# Patient Record
Sex: FEMALE | Race: OTHER
[De-identification: names, ages, dates, MRNs, and addresses within clinical notes are randomized per-mention and may not be internally consistent; named-entity substitution may affect disease eponyms.]

---

## 2018-09-20 ENCOUNTER — HOSPITAL ENCOUNTER (OUTPATIENT)
Dept: HOSPITAL 47 - ORWHC2ENDO | Age: 68
Discharge: HOME | End: 2018-09-20
Attending: SURGERY
Payer: MEDICARE

## 2018-09-20 VITALS — TEMPERATURE: 97.8 F

## 2018-09-20 VITALS — HEART RATE: 71 BPM | DIASTOLIC BLOOD PRESSURE: 77 MMHG | RESPIRATION RATE: 16 BRPM | SYSTOLIC BLOOD PRESSURE: 125 MMHG

## 2018-09-20 VITALS — BODY MASS INDEX: 30.2 KG/M2

## 2018-09-20 DIAGNOSIS — K21.9: ICD-10-CM

## 2018-09-20 DIAGNOSIS — J45.909: ICD-10-CM

## 2018-09-20 DIAGNOSIS — Z79.899: ICD-10-CM

## 2018-09-20 DIAGNOSIS — K29.50: Primary | ICD-10-CM

## 2018-09-20 DIAGNOSIS — R41.3: ICD-10-CM

## 2018-09-20 PROCEDURE — 88305 TISSUE EXAM BY PATHOLOGIST: CPT

## 2018-09-20 PROCEDURE — 43239 EGD BIOPSY SINGLE/MULTIPLE: CPT

## 2018-09-20 NOTE — P.OP
Date of Procedure: 09/20/18


Preoperative Diagnosis: 


GERD


Postoperative Diagnosis: 


Antral gastritis


Procedure(s) Performed: 


EGD


Anesthesia: MAC


Surgeon: Srinivasa Herring


Pathology: other (Antrum)


Condition: stable


Disposition: PACU


Description of Procedure: 


The patient's placed on the endoscopy table in the lateral position.  She 

received IV sedation.  The gastroscope placed oropharynx and passed in the 

esophagus into the stomach.  Scope was then placed through the pylorus.  The 

first and second portion of the duodenum.  Normal.  Scope was then brought back 

the antrum and this was mildly inflamed.  A biopsies performed.  The scope was 

then retroflexed and the remainder of the stomach appeared normal.  There is no 

significant hiatal hernia.  The GE junction was at 40 cm.  The distal esophagus 

appeared normal.  The proximal esophagus appeared Normal.  Scope was withdrawn 

for patient.

## 2018-09-20 NOTE — P.GSHP
History of Present Illness


H&P Date: 09/20/18


Chief Complaint: GERD





This is a 68-year-old female referred from Dr. Patel.  Patient presents 

today for EGD.  She's had issues with GERD.





Past Medical History


Past Medical History: GERD/Reflux


Additional Past Medical History / Comment(s): SOME SHORT TERM MEMORY LOSS,


History of Any Multi-Drug Resistant Organisms: None Reported


Past Surgical History: Cholecystectomy, Hernia Repair


Past Anesthesia/Blood Transfusion Reactions: No Reported Reaction


Smoking Status: Never smoker





- Past Family History


  ** Father


Family Medical History: Cancer





  ** Mother


Family Medical History: Cancer





Medications and Allergies


 Home Medications











 Medication  Instructions  Recorded  Confirmed  Type


 


Montelukast [Singulair] 10 mg PO HS 09/18/18 09/18/18 History


 


Ranitidine HCl [Zantac] 150 mg PO BID 09/18/18 09/18/18 History











 Allergies











Allergy/AdvReac Type Severity Reaction Status Date / Time


 


No Known Allergies Allergy   Verified 09/20/18 08:41














Surgical - Exam


 Vital Signs











Temp Pulse Resp BP Pulse Ox


 


 97.8 F   84   16   141/78   97 


 


 09/20/18 08:44  09/20/18 08:44  09/20/18 08:44  09/20/18 08:44  09/20/18 08:44














- General


well developed, no distress





- Eyes


PERRL





- ENT


normal pinna





- Neck


no masses





- Respiratory


normal expansion





- Cardiovascular


Rhythm: regular





- Abdomen


Abdomen: soft, non tender





Assessment and Plan


Assessment: 





GERD.  We'll perform EGD.